# Patient Record
Sex: FEMALE | Race: WHITE
[De-identification: names, ages, dates, MRNs, and addresses within clinical notes are randomized per-mention and may not be internally consistent; named-entity substitution may affect disease eponyms.]

---

## 2019-02-13 ENCOUNTER — HOSPITAL ENCOUNTER (OUTPATIENT)
Dept: HOSPITAL 75 - CARD | Age: 59
End: 2019-02-13
Attending: NURSE PRACTITIONER
Payer: COMMERCIAL

## 2019-02-13 DIAGNOSIS — R07.9: Primary | ICD-10-CM

## 2019-02-13 LAB
ALBUMIN SERPL-MCNC: 4.2 GM/DL (ref 3.2–4.5)
ALP SERPL-CCNC: 78 U/L (ref 40–136)
ALT SERPL-CCNC: 21 U/L (ref 0–55)
BASOPHILS # BLD AUTO: 0 10^3/UL (ref 0–0.1)
BASOPHILS NFR BLD AUTO: 0 % (ref 0–10)
BILIRUB SERPL-MCNC: 0.2 MG/DL (ref 0.1–1)
BUN/CREAT SERPL: 20
CALCIUM SERPL-MCNC: 9.5 MG/DL (ref 8.5–10.1)
CHLORIDE SERPL-SCNC: 105 MMOL/L (ref 98–107)
CK MB SERPL-MCNC: 0.6 NG/ML (ref ?–6.6)
CO2 SERPL-SCNC: 21 MMOL/L (ref 21–32)
CREAT SERPL-MCNC: 0.7 MG/DL (ref 0.6–1.3)
EOSINOPHIL # BLD AUTO: 0.1 10^3/UL (ref 0–0.3)
EOSINOPHIL NFR BLD AUTO: 2 % (ref 0–10)
ERYTHROCYTE [DISTWIDTH] IN BLOOD BY AUTOMATED COUNT: 13.1 % (ref 10–14.5)
GFR SERPLBLD BASED ON 1.73 SQ M-ARVRAT: > 60 ML/MIN
GLUCOSE SERPL-MCNC: 98 MG/DL (ref 70–105)
HCT VFR BLD CALC: 43 % (ref 35–52)
HGB BLD-MCNC: 14.1 G/DL (ref 11.5–16)
LYMPHOCYTES # BLD AUTO: 2.6 X 10^3 (ref 1–4)
LYMPHOCYTES NFR BLD AUTO: 33 % (ref 12–44)
MANUAL DIFFERENTIAL PERFORMED BLD QL: NO
MCH RBC QN AUTO: 30 PG (ref 25–34)
MCHC RBC AUTO-ENTMCNC: 33 G/DL (ref 32–36)
MCV RBC AUTO: 92 FL (ref 80–99)
MONOCYTES # BLD AUTO: 0.6 X 10^3 (ref 0–1)
MONOCYTES NFR BLD AUTO: 8 % (ref 0–12)
NEUTROPHILS # BLD AUTO: 4.5 X 10^3 (ref 1.8–7.8)
NEUTROPHILS NFR BLD AUTO: 58 % (ref 42–75)
PLATELET # BLD: 223 10^3/UL (ref 130–400)
PMV BLD AUTO: 10.8 FL (ref 7.4–10.4)
POTASSIUM SERPL-SCNC: 4.2 MMOL/L (ref 3.6–5)
PROT SERPL-MCNC: 6.9 GM/DL (ref 6.4–8.2)
SODIUM SERPL-SCNC: 137 MMOL/L (ref 135–145)
WBC # BLD AUTO: 7.8 10^3/UL (ref 4.3–11)

## 2019-02-13 PROCEDURE — 93005 ELECTROCARDIOGRAM TRACING: CPT

## 2019-02-13 PROCEDURE — 84484 ASSAY OF TROPONIN QUANT: CPT

## 2019-02-13 PROCEDURE — 82553 CREATINE MB FRACTION: CPT

## 2019-02-13 PROCEDURE — 36415 COLL VENOUS BLD VENIPUNCTURE: CPT

## 2019-02-13 PROCEDURE — 85025 COMPLETE CBC W/AUTO DIFF WBC: CPT

## 2019-02-13 PROCEDURE — 80053 COMPREHEN METABOLIC PANEL: CPT

## 2019-04-02 ENCOUNTER — HOSPITAL ENCOUNTER (OUTPATIENT)
Dept: HOSPITAL 75 - RAD | Age: 59
End: 2019-04-02
Attending: NURSE PRACTITIONER
Payer: COMMERCIAL

## 2019-04-02 DIAGNOSIS — Z12.31: Primary | ICD-10-CM

## 2019-04-02 PROCEDURE — 77067 SCR MAMMO BI INCL CAD: CPT

## 2019-04-02 NOTE — DIAGNOSTIC IMAGING REPORT
INDICATION: 

Routine screening.



COMPARISON: 

04/05/2012.



TECHNIQUE: 

2D and 3D bilateral screening mammography was performed with CAD.



FINDINGS:

Scattered fibroglandular densities are identified bilaterally.

The parenchymal pattern is stable. No dominant mass or malignant

appearing microcalcifications are seen. The axillae are

unremarkable.



IMPRESSION: 

No mammographic features suspicious for malignancy are

identified.



ACR BI-RADS Category 2: Benign findings.

Result letter will be mailed to the patient.

Note: At least 10% of breast cancer is not imaged by mammography.



Dictated by: 



  Dictated on workstation # [id number]

## 2021-01-21 ENCOUNTER — HOSPITAL ENCOUNTER (OUTPATIENT)
Dept: HOSPITAL 75 - RAD | Age: 61
End: 2021-01-21
Attending: FAMILY MEDICINE
Payer: COMMERCIAL

## 2021-01-21 DIAGNOSIS — Z12.31: Primary | ICD-10-CM

## 2021-01-21 PROCEDURE — 77067 SCR MAMMO BI INCL CAD: CPT

## 2021-01-21 PROCEDURE — 77063 BREAST TOMOSYNTHESIS BI: CPT

## 2021-01-21 NOTE — DIAGNOSTIC IMAGING REPORT
INDICATION: Routine screening.



COMPARISON is made with prior mammogram from 04/02/2019.



2-D and 3-D bilateral screening mammography was performed with

CAD.



Both breasts are heterogeneous dense, limiting sensitivity of

mammography. The parenchymal pattern is stable. No mass or

malignant appearing microcalcifications  are seen. Axillae are

unremarkable.



IMPRESSION: BI-RADS Category 1



No mammographic features suspicious for malignancy are

identified. 



ACR BI-RADS Category 1: Negative.

Result letter will be mailed to the patient.

Note:  At least 10% of breast cancer is not imaged by

mammography.



Dictated by: 



  Dictated on workstation # [id number]

## 2021-04-08 ENCOUNTER — HOSPITAL ENCOUNTER (OUTPATIENT)
Dept: HOSPITAL 75 - RAD | Age: 61
End: 2021-04-08
Attending: NURSE PRACTITIONER
Payer: COMMERCIAL

## 2021-04-08 DIAGNOSIS — M17.11: Primary | ICD-10-CM

## 2021-04-08 PROCEDURE — 73562 X-RAY EXAM OF KNEE 3: CPT

## 2021-04-08 NOTE — DIAGNOSTIC IMAGING REPORT
INDICATION: Right knee pain.



Time of exam 11:03 AM



3 views right knee demonstrate normal alignment. There is medial

compartmental degenerative change with joint space narrowing and

marginal spurring. There is some spurring of the tibial spines.

No fracture, dislocation or effusion is detected.



IMPRESSION: Medial compartmental degenerative change. No acute

bony abnormality is detected.



Dictated by: 



  Dictated on workstation # [id number]

## 2021-06-04 ENCOUNTER — HOSPITAL ENCOUNTER (OUTPATIENT)
Dept: HOSPITAL 75 - RAD | Age: 61
End: 2021-06-04
Attending: FAMILY MEDICINE
Payer: COMMERCIAL

## 2021-06-04 DIAGNOSIS — M48.02: ICD-10-CM

## 2021-06-04 DIAGNOSIS — M50.31: ICD-10-CM

## 2021-06-04 DIAGNOSIS — M43.12: ICD-10-CM

## 2021-06-04 DIAGNOSIS — M50.121: ICD-10-CM

## 2021-06-04 DIAGNOSIS — G31.1: Primary | ICD-10-CM

## 2021-06-04 DIAGNOSIS — I67.82: ICD-10-CM

## 2021-06-04 DIAGNOSIS — M50.122: ICD-10-CM

## 2021-06-04 PROCEDURE — 72141 MRI NECK SPINE W/O DYE: CPT

## 2021-06-04 PROCEDURE — 70551 MRI BRAIN STEM W/O DYE: CPT

## 2021-06-04 NOTE — DIAGNOSTIC IMAGING REPORT
CLINICAL INDICATION: Patient with headache on the left side x1

month. Patient has disk disease.



EXAM: MRI of the brain performed without IV contrast. Sequences

include axial DWI, ADC map, axial T2, axial FLAIR, axial T1,

coronal gradient echo, and sagittal T1.



COMPARISON: None.



FINDINGS:

There is no evidence of acute cerebral infarct, intracranial

hemorrhage, or gross mass effect. 



The brain parenchymal volume appears appropriate for patient's

age. There are a few focal areas of high T2 signal white matter

changes involving both cerebral hemispheres, likely representing

chronic small vessel ischemic disease. There is normal gray-white

matter distinction.  There is no significant midline shift or

herniation. 



The [place] vascular structures show no gross

abnormality as visualized. The pituitary gland, sella, and

suprasellar regions are unremarkable as visualized. There is no

evidence of hydrocephalus. The basal cisterns are unremarkable.

The skull, extracranial soft tissue, and orbits are unremarkable.

The paranasal sinuses are unremarkable. Temporal bones show no

significant abnormality.



IMPRESSION:

1: There is no evidence of acute intracranial process.



2: Mild age-related brain parenchymal changes including mild

chronic small vessel ischemic disease.



Dictated by: 



  Dictated on workstation # [id number]

## 2021-06-07 NOTE — DIAGNOSTIC IMAGING REPORT
Clinical indication: Patient with headache involving the left

side x1 month. Disk disease.



Exam: MRI of the cervical spine performed without IV contrast. 

Sequences include sagittal T1, sagittal T2, sagittal stir,

sagittal T2 fat-sat and axial T2. Of note, axial T2 sequences

were performed on 06/07/2021 and added on to this exam.



Comparison: MRI of the cervical spine without contrast dated

12/28/2011.



Findings:

There is no acute cervical spine fracture. There is Modic type II

degenerative signal changes involving the C4-C5 and C5-C6

endplates.



Limited visualization of posterior fossa and cervical spinal cord

shows no significant abnormality. There is no significant

paraspinal soft tissue abnormality. There is progression of

degenerative spurs anteriorly involving the mid cervical spine.



C1-C2: There are degenerative spurs involving the atlantoodontoid

interval anteriorly with no significant central canal narrowing.



C2-C3: There is mild to moderate bilateral facet arthropathy

which is slightly progressed. There is no significant central

spinal canal or neural foramen narrowing.



C3-C4: There is interval development of a mild diffuse disk bulge

and small left-sided uncinate spurs. There is mild left neural

foramen narrowing. There is no significant right neural foramen

narrowing. There is minimal encroachment upon the central canal

anteriorly.



C4-C5: There is interval development of grade 1 retrolisthesis of

C4 on C5. There is development of a diffuse disk bulge and

bilateral uncinate spurs. There is moderate loss of disk space

height. There is mild to moderate central canal stenosis,

moderate to severe right neural foramen narrowing and mild left

neural foramen narrowing which has progressed in the interim.



C5-C6: There is interval development of mild grade 1

retrolisthesis of C5 on C6. There is increased size of the

diffuse disk bulge with moderate to severe loss of disk space

height which has progressed. There is severe bilateral neural

foramen narrowing and moderate central canal stenosis which has

progressed.



C6-C7: There is development of a subtle posterior disk bulge.

There is progression of mild bilateral neural foramen narrowing

and ligamentum flavum buckling. There is no significant central

spinal canal or neural foramen narrowing.



C7-T1: There is progression of mild bilateral facet arthropathy.

There is no significant central spinal canal or neural foramen

narrowing.



IMPRESSION:

1: There is no acute cervical spine fracture.



2: There is interval progression of cervical spine degenerative

disk disease which is most pronounced at the C3-C6 levels.



3: There is interval progression of grade 1 retrolisthesis of C4

on C5. There is interval development of a diffuse disk bulge with

mild to moderate central canal stenosis, moderate to severe right

neural foramen narrowing and mild left neural foramen narrowing.



4: There is interval development of grade 1 retrolisthesis of C5

on C6, increased diffuse disk bulge. There is progression of

severe C5-C6 bilateral neural foraminal narrowing and moderate

central canal stenosis.



 



Dictated by: 



  Dictated on workstation # [id number]

## 2021-07-08 ENCOUNTER — HOSPITAL ENCOUNTER (OUTPATIENT)
Dept: HOSPITAL 75 - REHAB | Age: 61
Discharge: HOME | End: 2021-07-08
Attending: FAMILY MEDICINE
Payer: COMMERCIAL

## 2021-07-08 DIAGNOSIS — R51.9: ICD-10-CM

## 2021-07-08 DIAGNOSIS — M54.2: Primary | ICD-10-CM

## 2021-07-08 DIAGNOSIS — Z87.39: ICD-10-CM

## 2022-11-10 ENCOUNTER — HOSPITAL ENCOUNTER (OUTPATIENT)
Dept: HOSPITAL 75 - RAD | Age: 62
End: 2022-11-10
Attending: NURSE PRACTITIONER
Payer: COMMERCIAL

## 2022-11-10 DIAGNOSIS — M23.231: ICD-10-CM

## 2022-11-10 DIAGNOSIS — M17.11: Primary | ICD-10-CM

## 2022-11-10 DIAGNOSIS — M66.0: ICD-10-CM

## 2022-11-10 PROCEDURE — 73721 MRI JNT OF LWR EXTRE W/O DYE: CPT

## 2022-11-10 NOTE — DIAGNOSTIC IMAGING REPORT
MRI RT lower ext joint w/o



TECHNIQUE: Multiplanar, multisequence MR imaging of the right

knee was performed without contrast.



COMPARISON: Right knee radiographs of 04/08/2021 



INDICATION: Right knee pain.



FINDINGS:



MENISCI 

Medial meniscus: Diffuse degenerative free edge truncation

throughout the entire medial meniscus. This is most noticeable in

the body of the medial meniscus.

Lateral meniscus: Normal.



LIGAMENTS

ACL: Intact.

PCL: Intact.

MCL: Intact.

LCL: The lateral collateral ligamentous complex is intact.



EXTENSOR MECHANISM

The extensor mechanism is intact.



CARTILAGE

Medial compartment: Diffuse full-thickness articular cartilage

loss throughout the weightbearing aspects of the medial

compartment. Subchondral bone marrow edema is present on both

sides of the joint.

Lateral compartment: The lateral compartment articular cartilage

is preserved without high-grade chondromalacia.

Patellofemoral compartment: The patellofemoral articular

cartilage is well preserved without high-grade chondromalacia.



BONE

No fracture, stress fracture or osteonecrosis. 



SOFT TISSUE

Large knee joint effusion. Small Baker's cyst has fluid tracking

away from the sac likely due to rupture.



IMPRESSION: 

1. Severe degenerative arthritis in the medial compartment with

diffuse full-thickness articular cartilage loss.

2. There is associated degenerative tearing throughout the free

edge of the medial meniscus.

3. Large knee joint effusion.

4. Small ruptured Baker's cyst. 





 



Dictated by: 



  Dictated on workstation # [id number]

## 2022-11-17 ENCOUNTER — HOSPITAL ENCOUNTER (OUTPATIENT)
Dept: HOSPITAL 75 - CATH | Age: 62
LOS: 1 days | Discharge: HOME | End: 2022-11-18
Attending: INTERNAL MEDICINE
Payer: COMMERCIAL

## 2022-11-17 VITALS — WEIGHT: 177.69 LBS | BODY MASS INDEX: 27.89 KG/M2 | HEIGHT: 67.01 IN

## 2022-11-17 DIAGNOSIS — K21.9: ICD-10-CM

## 2022-11-17 DIAGNOSIS — I26.92: Primary | ICD-10-CM

## 2022-11-17 DIAGNOSIS — Z87.891: ICD-10-CM

## 2022-11-17 DIAGNOSIS — E78.2: ICD-10-CM

## 2022-11-17 DIAGNOSIS — Z79.899: ICD-10-CM

## 2022-11-17 DIAGNOSIS — Z79.890: ICD-10-CM

## 2022-11-17 DIAGNOSIS — I10: ICD-10-CM

## 2022-11-17 DIAGNOSIS — E03.9: ICD-10-CM

## 2022-11-17 DIAGNOSIS — I25.110: ICD-10-CM

## 2022-11-17 LAB
ALBUMIN SERPL-MCNC: 3.7 GM/DL (ref 3.2–4.5)
ALP SERPL-CCNC: 75 U/L (ref 40–136)
ALT SERPL-CCNC: 104 U/L (ref 0–55)
BASOPHILS # BLD AUTO: 0 10^3/UL (ref 0–0.1)
BASOPHILS NFR BLD AUTO: 0 % (ref 0–10)
BILIRUB SERPL-MCNC: 0.4 MG/DL (ref 0.1–1)
BUN/CREAT SERPL: 18
CALCIUM SERPL-MCNC: 8.8 MG/DL (ref 8.5–10.1)
CHLORIDE SERPL-SCNC: 106 MMOL/L (ref 98–107)
CHOLEST SERPL-MCNC: 263 MG/DL (ref ?–200)
CO2 SERPL-SCNC: 20 MMOL/L (ref 21–32)
CREAT SERPL-MCNC: 0.66 MG/DL (ref 0.6–1.3)
EOSINOPHIL # BLD AUTO: 0.1 10^3/UL (ref 0–0.3)
EOSINOPHIL NFR BLD AUTO: 0 % (ref 0–10)
GFR SERPLBLD BASED ON 1.73 SQ M-ARVRAT: 98 ML/MIN
GLUCOSE SERPL-MCNC: 105 MG/DL (ref 70–105)
HCT VFR BLD CALC: 42 % (ref 35–52)
HDLC SERPL-MCNC: 66 MG/DL (ref 40–60)
HGB BLD-MCNC: 13.8 G/DL (ref 11.5–16)
LYMPHOCYTES # BLD AUTO: 1.9 10^3/UL (ref 1–4)
LYMPHOCYTES NFR BLD AUTO: 14 % (ref 12–44)
MANUAL DIFFERENTIAL PERFORMED BLD QL: NO
MCH RBC QN AUTO: 31 PG (ref 25–34)
MCHC RBC AUTO-ENTMCNC: 33 G/DL (ref 32–36)
MCV RBC AUTO: 94 FL (ref 80–99)
MONOCYTES # BLD AUTO: 1.1 10^3/UL (ref 0–1)
MONOCYTES NFR BLD AUTO: 8 % (ref 0–12)
NEUTROPHILS # BLD AUTO: 10.4 10^3/UL (ref 1.8–7.8)
NEUTROPHILS NFR BLD AUTO: 77 % (ref 42–75)
PLATELET # BLD: 147 10^3/UL (ref 130–400)
PMV BLD AUTO: 9.9 FL (ref 9–12.2)
POTASSIUM SERPL-SCNC: 3.9 MMOL/L (ref 3.6–5)
PROT SERPL-MCNC: 6.4 GM/DL (ref 6.4–8.2)
SODIUM SERPL-SCNC: 136 MMOL/L (ref 135–145)
TRIGL SERPL-MCNC: 247 MG/DL (ref ?–150)
VLDLC SERPL CALC-MCNC: 49 MG/DL (ref 5–40)
WBC # BLD AUTO: 13.6 10^3/UL (ref 4.3–11)

## 2022-11-17 PROCEDURE — 93306 TTE W/DOPPLER COMPLETE: CPT

## 2022-11-17 PROCEDURE — 87081 CULTURE SCREEN ONLY: CPT

## 2022-11-17 PROCEDURE — 85025 COMPLETE CBC W/AUTO DIFF WBC: CPT

## 2022-11-17 PROCEDURE — 93005 ELECTROCARDIOGRAM TRACING: CPT

## 2022-11-17 PROCEDURE — 85379 FIBRIN DEGRADATION QUANT: CPT

## 2022-11-17 PROCEDURE — 36415 COLL VENOUS BLD VENIPUNCTURE: CPT

## 2022-11-17 PROCEDURE — 80061 LIPID PANEL: CPT

## 2022-11-17 PROCEDURE — 93458 L HRT ARTERY/VENTRICLE ANGIO: CPT

## 2022-11-17 PROCEDURE — 84443 ASSAY THYROID STIM HORMONE: CPT

## 2022-11-17 PROCEDURE — 93970 EXTREMITY STUDY: CPT

## 2022-11-17 PROCEDURE — 80053 COMPREHEN METABOLIC PANEL: CPT

## 2022-11-17 PROCEDURE — 85027 COMPLETE CBC AUTOMATED: CPT

## 2022-11-17 PROCEDURE — 71275 CT ANGIOGRAPHY CHEST: CPT

## 2022-11-17 RX ADMIN — SODIUM CHLORIDE SCH MLS/HR: 900 INJECTION, SOLUTION INTRAVENOUS at 18:43

## 2022-11-17 RX ADMIN — APIXABAN SCH MG: 5 TABLET, FILM COATED ORAL at 21:20

## 2022-11-17 RX ADMIN — SODIUM CHLORIDE SCH MLS/HR: 900 INJECTION, SOLUTION INTRAVENOUS at 21:00

## 2022-11-17 RX ADMIN — APIXABAN SCH MG: 5 TABLET, FILM COATED ORAL at 15:05

## 2022-11-17 NOTE — CARDIAC CATH REPORT
CARDIAC CATHETERIZATION


DATE OF PROCEDURE: 11/17/2022





INDICATION: Abnormal electrocardiogram consistent with a possible anterior ST 

elevation myocardial infarction.





HISTORY: The patient is a 62 year old female with no previously known history of

coronary artery disease.  She presented to [facility] earlier this 

morning with chest pain.  Her electrocardiogram showed anterior ST elevation 

with lateral ST depression.  A code STEMI was called from the outside hospital 

and she was transferred to our hospital for emergent cardiac catheterization.  

Given her current clinical status, she is considered severely frail.  She has no

history of heart failure.





PROCEDURES PERFORMED: 


1.  Left heart catheterization with hemodynamic measurements.


2.  Diagnostic native coronary angiography.





PROCEDURE DESCRIPTION: After informed consent and in the fasting state, left 

heart catheterization was performed through the right radial artery utilizing a 

6 [place] system by percutaneous approach.  A 5 [place] JR4 catheter and a 6 

[place] CLS 3.5 guide catheter were utilized for the diagnostic portion of the 

procedure.  All catheters were exchanged over a guidewire.  Following the 

procedure, a vascular band was applied to the radial artery access site and the 

sheath was removed with good hemostasis.





RESULTS:





HEMODYNAMICS: The aortic pressure was 109/62 mmHg.  The left ventricular 

pressure was 124/0 mmHg with a left ventricular end-diastolic pressure of 5 

mmHg.  There was no significant pressure gradient upon pullback across the 

aortic valve.





CORONARY ANGIOGRAPHY:





Left main coronary artery: Free of significant disease.





Left anterior descending coronary artery: There was a 50% stenosis in the mid 

segment at the takeoff of the first septal  with LIVIA-3 flow.





Left circumflex coronary artery: Free of significant disease.





Right coronary artery: Dominant and free of significant disease.








IMPRESSION:


1.  Normal left heart pressures.


2.  There was a 50% stenosis in the mid left anterior descending coronary artery

but no evidence of an acute thrombotic occlusion to explain the ST elevation on 

the electrocardiogram.


3.  I will obtain an echocardiogram later today to assess her left ventricular 

systolic function.


4.  I suspect the patient may have had noncardiac chest pain.





Certain portions of this document may have been dictated utilizing voice 

recognition technology.  Inherent to this technology, typographical and 

grammatical errors may exist.  As much as I am diligent to identify and correct 

these mistakes, some errors may remain in the document.











GEMINI SANTANA JR, MD         Nov 17, 2022 11:42

## 2022-11-17 NOTE — PRE-OP NOTE & CONSCIOUS SEDAT
Pre-Operative Progress Note


Date H&P Reviewed:  Nov 17, 2022


Time H&P Reviewed:  10:24


History & Physical:  H&P Reviewed, Patient Examed, No changes noted


Pre-Op Diagnosis:  Anterior STEMI





Conscious Sedation Pre-Proced


ASA Score


2


For ASA 3 and 4: Consider anesthesia and medical clearance. Also, for patients

with a history of failed moderate sedation consider anesthesia.

















Airway 


 


Lungs 


 


Heart 


 


 ASA score


 


 ASA 1: a normal healthy patient


 


 ASA 2:  a patient with a mild systemic disease (mid diabetes, controlled 

hypertension, obesity 


 


 ASA 3:  a patient with a severe systemic disease that limits activity  (angina,

COPD, prior Myocardial infarction)


 


 ASA 4:  a patient with an incapacitating disease that is a constant threat to 

life (CHF, renal failure)


 


 ASA 5:  a moribund patient not expected to survive 24 hrs.  (ruptured aneurysm)


 


 ASA 6:  a declared brain-dead patient whose organs are being harvested.


 


 For emergent operations, add the letter E after the classification











Mallampati Classification


Grade 1





Sedation Plan


Analgesia, Amnesia, Plan communicated to team members, Discussed options with 

patient/fam, Discussed risks with patient/fam


The patient is an appropriate candidate to undergo the planned procedure, 

sedation, and anesthesia.





The patient immediately re-assessed prior to indication.





Given her current clinical status, she is considered severely frail.  She has no

history of heart failure.











GEMINI SANTANA JR, MD         Nov 17, 2022 10:25

## 2022-11-17 NOTE — HISTORY & PHYSICAL
ANAID HENSLEY 11/17/22 1031:


History of Present Illness


History of Present Illness


Reason for visit/HPI


Hammad is a 62 year old female with no prior cardiac history presenting from 

[place] [facility] with chest pain and SOB. EKG revealed ST elevation and she was 

transported to the via [facility] Cath lab by EMS. Her chest pain began at 8:30am 

radiating into her shoulder. She had been SOB for two days prior to the onset of

chest pain. She has never had these symptoms before. She does not smoke but does

have a family history of heart disease.


Date of Admission


11/17/22


Date Seen by a Provider:  Nov 17, 2022


Time Seen by a Provider:  10:30


I consulted on this patient on


11/17/22


 10:27


Attending Physician


Dior Thompson MD


Admitting Physician


Admitting Physician:


 








Attending Physician:


Gemini Pete Jr, MD


Consult








Allergies and Home Medications


Allergies


Coded Allergies:  


     No Known Drug Allergies (Unverified , 11/17/22)





Past Medical-Social-Family Hx


Patient Social History


Smoking Status:  Never a Smoker





Family Medical History


Heart Disease





Review of Systems


Respiratory:  dyspnea on exertion, short of breath


Cardiovascular:  chest pain





Physical Exam


Vital Signs


Capillary Refill :


Height, Weight, BMI


Height: '"


Weight: lbs. oz. kg;  BMI


Method:





GEMINI PETE JR, MD 11/17/22 1244:


History of Present Illness


History of Present Illness


Reason for visit/HPI





Dr. Pete,





I had the pleasure of seeing Sis in the cardiac catheterization laboratory 

today.  She has no known history of coronary artery disease but does have a 

cardiac risk factor of hypertension.  For the past couple of days she has been 

experiencing increasing shortness of breath.  She denies a cough, fever or 

chills.  She did not seek medical attention.  Then this morning she was making 

her bed and suddenly developed substernal chest tightness.  This was in the 

center of her chest.  This radiated to her shoulder.  This may have made her 

feel somewhat more short of breath.  She became concerned and went to the 

emergency room at [facility] for further evaluation.  Her el

ectrocardiogram showed sinus rhythm with ST elevation in V1 and V2 and ST 

depression in the lateral limb leads and lateral chest leads.  A code STEMI was 

called and she was transported to our hospital for emergency cardiac 

catheterization.  She denies any previous history of this sort of chest 

discomfort.  She denies paroxysmal nocturnal dyspnea, insomnia, palpitations, 

lightheadedness, syncope, or ankle edema.





Allergies and Home Medications


Allergies


Coded Allergies:  


     No Known Drug Allergies (Unverified , 11/17/22)





Patient Home Medication List


Home Medication List Reviewed:  Yes





Past Medical-Social-Family Hx


Patient Social History


Marrital Status:  


Smoking Status:  Former Smoker





Family Medical History


Heart Disease





Review of Systems


Constitutional:  see HPI


Review of 10 organ systems is as per the history of present illness, otherwise 

negative.





Physical Exam


General Appearance:  No Apparent Distress


Comments





Dr. Pete:





General: Alert. No acute distress. Well nourished and appears stated age.


Eye: Extraocular movements are intact. Conjunctivae are clear. There are no 

xanthelasma.


HENT: Normocephalic. Atraumatic. Carotid pulsations 2/2 without bruits.


Neck: Jugular venous pressure does not appear elevated. No thyromegaly 

appreciated.


Respiratory: Lungs are clear to auscultation. Respirations are non-labored. 

Breath sounds are equal. Symmetrical chest wall expansion.


Cardiovascular: Normal rate. Regular rhythm. No murmur. No gallop. Point of 

maximal impulse is not appear displaced. Good pulses equal in all extremities. 

No edema.


Gastrointestinal: Soft. Normal bowel sounds.


Skin: Skin turgor is normal. There is no pallor.


Musculoskeletal: No kyphosis or scoliosis appreciated.


Neurologic: Alert and oriented to person, place, time. Cranial nerves 3-12 

appear grossly intact. The patient has good motor tone strength in the upper and

lower extremities bilaterally.


Psychiatric: Cooperative. Appropriate mood & affect.





Assessment/Plan


Assessment and Plan


Problems:  


(1) Chest pain


Assessment & Plan:  Exact etiology unclear.  She underwent a cardiac 

catheterization that showed mild to moderate disease in the left anterior 

descending coronary artery but no acute vessel closure and no obstructive 

disease.  I suspect her chest discomfort could be due to gastroesophageal reflux

disease.  She was taking famotidine at home.  I will change this over to 

pantoprazole.  I have also ordered a D-dimer to screen for possible pulmonary 

embolism.





(2) Coronary artery disease with unstable angina pectoris


Assessment & Plan:  She does have coronary artery disease noted at cardiac 

catheterization but as above, this is nonobstructive.  She could have possibly 

had coronary spasm at the site of the atherosclerosis leading to the ST changes 

and her chest discomfort.  However, this does not appear to be an ST elevation 

myocardial infarction.  I recommend she start on aspirin and statin medication. 

I will resume her beta-blocker that she was taking at home.





(3) Primary hypertension


Assessment & Plan:  I will resume the metoprolol that she was taking at home.





(4) Gastroesophageal reflux disease without esophagitis


Assessment & Plan:  I will intensify her therapy by changing her H2 blocker over

to a proton pump inhibitor in the event the reflux disease is causing or at 

least contributing to her chest discomfort.





(5) Acquired hypothyroidism


Assessment & Plan:  I have ordered a TSH level. Resume thyroud medication.








Admission Diagnosis


Admission Status:  Observation





Supervisory-Addendum Brief


Verification & Attestation


Participated in pt care:  history, MDM, physical


Personally performed:  exam, history, MDM


Care discussed with:  Medical Student


Procedures:  n/a


Results interpretation:  Verified all documentation





I independently performed my own history and physical and physical examination. 

I formulated my own impression and plan.  I also reviewed the documentation of 

the medical student.











ANAID HENSLEY                    Nov 17, 2022 10:31


GEMINI PETE JR, MD         Nov 17, 2022 12:44

## 2022-11-17 NOTE — DIAGNOSTIC IMAGING REPORT
INDICATION: Elevated D-dimer, dyspnea



TECHNIQUE: Multiple contiguous axial images were obtained through

the chest after uneventful bolus administration of intravenous

contrast. 3D reconstructed CTA MIP acquisitions were also

performed.

Auto Exposure Controls were utilized during the CT exam to meet

ALARA standards for radiation dose reduction. 

 

COMPARISON: There is no prior CT for comparison. 



There are bilateral pulmonary emboli, including saddle component.

There is some RV dilatation and septal bowing suggesting right

heart strain. There is no adenopathy in the mediastinum or phoebe.

There are some coronary calcifications. The thoracic aorta shows

no aneurysm or dissection. Great vessel origins are patent. There

is no pleural or pericardial fluid.



Visualized portions of the upper abdomen appear unremarkable.



Lung parenchymal windows demonstrated no pulmonary parenchymal

infiltrates or discrete nodules.



IMPRESSION:



Bilateral pulmonary emboli, including saddle component, with

evidence of some degree of right heart strain.



Results were called to the referring physician at the time of

dictation.



Dictated by: 



  Dictated on workstation # [id number]

## 2022-11-17 NOTE — HISTORY & PHYSICIAL
History of Present Illness


History of Present Illness


Reason for visit/HPI


Hammad is a 63 y/o female who is known to me from clinic.  She presented to 

her local hospital with acute onset of shortness of breath, chest pain, 

diaphoresis and nausea that started early this morning while making her bed.  

She reports that she had been having intermittent episodes of shortness of 

breath over the past week.  She notes that she had recently traveled by vehicle 

[place]  She reports that the shortness of breath 

started after the travel by a week or so.


She was evaluated in the [place] ER, thought to have had a heart attack and was 

transferred to the care of Dr. Pete for emergent eval and heart c

atheterization.  The heart cath did not reveal any obstructive disease and 

further work-up revealed pulmonary emboli.  She was admitted to the icu for 

management.


She currently is not complaining of shortness of breath or chest pain, does have

some aching in her lower extremities.


Date of Admission


11/17/2022


Date Seen by a Provider:  Nov 17, 2022


Time Seen by a Provider:  18:30


Attending Physician


Yousuf Thompson MD


Admitting Physician


Admitting Physician:


 Dr. Pete





Attending Physician:


Marco Pete Jr, MD


Consult








Allergies and Home Medications


Allergies


Coded Allergies:  


     No Known Drug Allergies (Unverified , 11/17/22)





Patient Home Medication List


Home Medication List Reviewed:  Yes


Celecoxib (Celecoxib) 200 Mg Capsule, 200 MG PO DAILY, (Reported)


   Entered as Reported by: ISIDRO MCALLISTER on 11/17/22 1425


   Last Action: Held


Cholecalciferol (Vitamin D3) (Vitamin D3) 125 Mcg (5000 Unit) Capsule, 125 MCG 

PO HS, (Reported)


   Entered as Reported by: ISIDRO MCALLISTER on 11/17/22 1425


   Last Action: Reviewed


Estradiol (Estrace Tablet) 1 Mg Tablet, 1 MG PO DAILY, (Reported)


   Entered as Reported by: ISIDRO MCALLISTER on 11/17/22 1425


   Last Action: Held


Famotidine (Acid Reducer (FAMOTIDINE)) 20 Mg Tablet, 20 MG PO DAILY, (Reported)


   Entered as Reported by: ISIDRO MCALLISTER on 11/17/22 1425


   Last Action: Held


Hydrochlorothiazide (Hydrochlorothiazide) 25 Mg Tablet, 12.5 MG PO DAILY, 

(Reported)


   Entered as Reported by: ISIDRO MCALLISTER on 11/17/22 1425


   Last Action: Reviewed


Levothyroxine Sodium (Levothyroxine Sodium) 50 Mcg Tablet, 50 MCG PO 

ELLISON,MO,WE,FR,SA, (Reported)


   Entered as Reported by: ISIDRO MCALLISTER on 11/17/22 1425


   Last Action: Continued


Levothyroxine Sodium (Levothyroxine Sodium) 50 Mcg Tablet, 75 MCG PO TU,TH, 

(Reported)


   Entered as Reported by: ISIDRO MCALLISTER on 11/17/22 1425


   Last Action: Continued


Melatonin (Melatonin) 10 Mg Tablet, 10 MG PO HS, (Reported)


   Entered as Reported by: ISIDRO MCALLISTER on 11/17/22 1425


   Last Action: Continued


Metoprolol Succinate (Metoprolol Succinate) 25 Mg Tab.er.24h, 25 MG PO HS, 

(Reported)


   Entered as Reported by: ISIDRO MCALLISTER on 11/17/22 1425


   Last Action: Continued


Omega-3/Dha/Epa/Fish Oil (Fish Oil 1,200 mg Softgel) 1,200 Mg (144 Mg-216 Mg) 

Capsule, 1,200 MG PO HS, (Reported)


   Entered as Reported by: ISIDRO MCALLISTER on 11/17/22 1425


   Last Action: Converted





Past Medical-Social-Family Hx


Patient Social History


Marrital Status:  


Number of Children:  1


Living Status:  lives with spouse near [place]


Smoking Status:  Former Smoker


Have you traveled recently?:  Yes


Where was recent travel?:  [facility] [place] ([place])


Alcohol Use?:  Yes


Substance type:  Caffeine


Pt feels they are or have been:  No





Immunizations Up To Date


Date of Influenza Vaccine:  Nov 1, 2022





Seasonal Allergies


Seasonal Allergies:  Yes





Surgeries


Yes


Hysterectomy (2020), Orthopedic (2021)





Respiratory


No


Currently Using CPAP:  No


Currently Using BIPAP:  No





Cardiovascular


Yes


Hypertension





Neurological


No





Reproductive System


Pregnant:  No





Genitourinary


No





Gastrointestinal


Yes


Gastroesophageal Reflux





Musculoskeletal


Yes


Arthritis





Endocrine


History of Endocrine Disorders:  Yes


Endocrine Disorders:  Hypothyroidsim


Are Your Blood Sugars Over 250:  No





HEENT


History of HEENT Disorders:  No


Loss of Vision:  Denies


Hearing Impairment:  Denies





Cancer


No





Psychosocial


History of Psychiatric Problem:  No





Integumentary


History of Skin or Integumenta:  No





Blood Transfusions


History of Blood Disorders:  No


Adverse Reaction to a Blood Tr:  No





Reviewed Nursing Assessment


Reviewed/Agree w Nursing PMH:  Yes





Family Medical History


Significant Family History:  Heart Disease, Diabetes, Hypertension





Review of Systems


Constitutional:  No chills; diaphoresis (prior to admission); No dizziness, No 

fever, No malaise, No weakness


EENTM:  No hearing loss, No hoarseness, No throat pain


Respiratory:  No cough; dyspnea on exertion; No short of breath, No wheezing


Cardiovascular:  No chest pain (pt had shortness of breath and chest pain prior 

to admission)


Gastrointestinal:  No abdominal pain, No constipation, No diarrhea, No nausea


Genitourinary:  no symptoms reported


Pregnant:  No


Musculoskeletal:  No back pain; joint pain (right knee pain)


Skin:  no symptoms reported


Psychiatric/Neurological:  No Symptoms Reported





All Other Systems Reviewed


Negative Unless Noted:  Yes





Physical Exam


Vital Signs





Vital Signs - First Documented








 11/17/22 11/17/22 11/17/22





 11:10 11:15 11:23


 


Temp   36.4


 


Pulse 109  


 


Resp  13 


 


B/P (MAP)  120/79 (93) 


 


Pulse Ox  95 


 


O2 Delivery  Nasal Cannula 


 


O2 Flow Rate  2.00 





Capillary Refill : Less Than 3 Seconds


Height, Weight, BMI


Height: '"


Weight: lbs. oz. kg; 28.16 BMI


Method:


General Appearance:  No Apparent Distress, WD/WN


HEENT:  PERRL/EOMI, Pharynx Normal


Neck:  Full Range of Motion, Supple


Respiratory:  Chest Non Tender, Lungs Clear, Normal Breath Sounds, No Accessory 

Muscle Use, No Respiratory Distress


Cardiovascular:  Regular Rate, Rhythm


Gastrointestinal:  Normal Bowel Sounds, Non Tender, Soft


Rectal:  Deferred


Back:  Normal Inspection


Extremity:  Normal Capillary Refill, Normal Range of Motion, No Pedal Edema, 

Other (slightly ttp right popliteal fossa and left popliteal fossa)


Neurologic/Psychiatric:  Alert, Oriented x3, No Motor/Sensory Deficits, Normal 

Mood/Affect


Skin:  Normal Color, Warm/Dry


Lymphatic:  No Adenopathy





Assessment/Plan


Assessment and Plan


as documented below


Problems:  


(1) Pulmonary emboli


Status:  Acute


Qualifiers:  


   Qualified Codes:  I26.92 - Saddle embolus of pulmonary artery without acute 

cor pulmonale


Assessment & Plan:  Bilateral pulmonary embolism with saddle component.  pt was 

started on eliquis, will continue with this treatment and pt to be on eliquis 

for at least 6 months post embolism.


Lower extremity US pending for the morning.


CT scan findings as follows:


There are bilateral pulmonary emboli, including saddle component. There is some 

RV dilatation and septal bowing suggesting right heart strain. There is no 

adenopathy in the mediastinum or phoebe. There are some coronary calcifications. 

The thoracic aorta shows no aneurysm or dissection. Great vessel origins are 

patent. There is no pleural or pericardial fluid. Visualized portions of the 

upper abdomen appear unremarkable. Lung parenchymal windows demonstrated no 

pulmonary parenchymal infiltrates or discrete nodules.





IMPRESSION:


Bilateral pulmonary emboli, including saddle component, with evidence of some 

degree of right heart strain.





(2) Chest pain


Status:  Acute


Qualifiers:  


   Qualified Codes:  R07.89 - Other chest pain


Assessment & Plan:  Heart catheterization - per Dr. Pete report - mild to mod

disease LAD.


DDimer positive


Pulmonary embolus





(3) Coronary artery disease with unstable angina pectoris


Status:  Acute


Qualifiers:  


   Qualified Codes:  I25.110 - Atherosclerotic heart disease of native coronary 

artery with unstable angina pectoris


Assessment & Plan:  As above -non obstructive mild to mod disease of LAD - will 

need blood pressure control, cholesterol to be controlled with statin therapy





(4) Primary hypertension


Status:  Chronic


Assessment & Plan:  Pt on hctz and metoprolol at home, the metoprolol was 

restarted on admission





(5) Gastroesophageal reflux disease without esophagitis


Status:  Chronic


Assessment & Plan:  pt changed from pepcid to pantoprazole





(6) Acquired hypothyroidism


Status:  Chronic


Assessment & Plan:  resumed home levothyroxine tonight - monitor labs, add Free 

T4 to blood in lab








Admission Diagnosis


Admission Status:  Other











YOUSUF THOMPSON MD            Nov 17, 2022 19:51

## 2022-11-18 LAB
ALBUMIN SERPL-MCNC: 3.6 GM/DL (ref 3.2–4.5)
ALP SERPL-CCNC: 71 U/L (ref 40–136)
ALT SERPL-CCNC: 72 U/L (ref 0–55)
BILIRUB SERPL-MCNC: 0.5 MG/DL (ref 0.1–1)
BUN/CREAT SERPL: 14
CALCIUM SERPL-MCNC: 8.9 MG/DL (ref 8.5–10.1)
CHLORIDE SERPL-SCNC: 110 MMOL/L (ref 98–107)
CO2 SERPL-SCNC: 20 MMOL/L (ref 21–32)
CREAT SERPL-MCNC: 0.76 MG/DL (ref 0.6–1.3)
GFR SERPLBLD BASED ON 1.73 SQ M-ARVRAT: 87 ML/MIN
GLUCOSE SERPL-MCNC: 114 MG/DL (ref 70–105)
HCT VFR BLD CALC: 40 % (ref 35–52)
HGB BLD-MCNC: 13.2 G/DL (ref 11.5–16)
MCH RBC QN AUTO: 31 PG (ref 25–34)
MCHC RBC AUTO-ENTMCNC: 33 G/DL (ref 32–36)
MCV RBC AUTO: 94 FL (ref 80–99)
PLATELET # BLD: 166 10^3/UL (ref 130–400)
PMV BLD AUTO: 10.7 FL (ref 9–12.2)
POTASSIUM SERPL-SCNC: 4.1 MMOL/L (ref 3.6–5)
PROT SERPL-MCNC: 6.1 GM/DL (ref 6.4–8.2)
SODIUM SERPL-SCNC: 141 MMOL/L (ref 135–145)
WBC # BLD AUTO: 11.1 10^3/UL (ref 4.3–11)

## 2022-11-18 RX ADMIN — APIXABAN SCH MG: 5 TABLET, FILM COATED ORAL at 08:20

## 2022-11-18 NOTE — CARDIOLOGY PROGRESS NOTE
Subjective


Date Seen by Provider:  Nov 18, 2022


Time Seen by Provider:  07:50


Subjective/Events-last exam


Pt appears comfortable this a.m. She denies SOB, chest pain, and syncope. She 

does admit to feeling palpitations pretty frequently, usually when she is laying

in bed at night. These last for a few seconds at a time. She has taken a water 

pill in the past and recently experienced some edema while traveling, but does 

not currently have edema.





Dr. Pete:





I am following her due to coronary artery disease and pulmonary embolism.  Her 

chest discomfort and dyspnea have resolved.  Today she states she has had a long

history of palpitations.  She will feel a fluttering in her chest mainly at 

night while she is trying to go to sleep.  She denies associated complaints.  

She has not brought this to the attention of her primary provider in the past.  

She denies syncope or ankle edema.





Certain portions of this document may have been dictated utilizing voice 

recognition technology.  Inherent to this technology, typographical and 

grammatical errors may exist.  As much as I am diligent to identify and correct 

these mistakes, some errors may remain in the document.





Focused Exam


Respiratory:  Lungs Clear, Normal Breath Sounds, No Accessory Muscle Use, No 

Respiratory Distress


Cardiovascular:  Regular Rate, Rhythm, No Murmur


Skin:  normal color, warm/dry





Objective-Cardiology


Exam


Last Set of Vital Signs





Vital Signs








 11/18/22 11/18/22 11/18/22





 07:47 08:00 08:23


 


Temp 36.5  


 


Pulse  70 


 


Resp  16 


 


B/P (MAP)  115/69 (84) 


 


Pulse Ox   95


 


O2 Delivery   Room Air


 


O2 Flow Rate  2.00 








I&O











Intake and Output 


 


 11/18/22





 00:00


 


Intake Total 800 ml


 


Balance 800 ml


 


 


 


Intake Oral 800 ml


 


# Voids 2


 


Daily Weight Change No








General:  Alert, Oriented X3, Cooperative, No Acute Distress


HEENT:  Atraumatic, EOMI


Neck:  Supple


Lungs:  Clear to Auscultation, Normal Air Movement


Heart:  Regular Rate, No Murmurs


Abdomen:  Soft, No Tenderness


Extremities:  No Edema


Skin:  No Significant Lesion


Neuro:  Normal Speech, Normal Tone


Psych/Mental Status:  Mental Status NL, Mood NL


Other physical findings





Dr. Pete:





General: Alert. No acute distress.


Eye: No xanthelasma.


HENT: Normocephalic.


Neck: Jugular venous pressure does not appear elevated.


Respiratory: Lungs are clear to auscultation. Respirations are non-labored. 

Breath sounds are equal. Symmetrical chest wall expansion.


Cardiovascular: Normal rate. Regular rhythm. No murmur. No gallop. No edema.


Gastrointestinal: Soft. Normal bowel sounds.


Skin: Warm. Dry.


Neurologic: Alert and oriented to person, place, time. Cranial nerves 3-11 

grossly intact.


Psychiatric: Cooperative. Appropriate mood & affect.





Results


Lab


Laboratory Tests


11/17/22 13:08








11/18/22 05:11














A/P-Cardiology


Admission Diagnosis





(1) Chest pain


Status:  Acute


Qualifiers:  


   Qualified Codes:  R07.89 - Other chest pain


(2) Pulmonary embolism with acute cor pulmonale


(3) Coronary artery disease with unstable angina pectoris


Status:  Acute


Qualifiers:  


   Qualified Codes:  I25.110 - Atherosclerotic heart disease of native coronary 

artery with unstable angina pectoris


(4) Primary hypertension


Status:  Chronic


(5) Gastroesophageal reflux disease without esophagitis


Status:  Chronic





Assessment/Plan


(1) Chest pain


Assessment & Plan:  D-Dimer elevation revealed yesterday; CT showed bilateral 

pulmonary emboli with possible saddle embolus. Eliquis was started. Venous 

Doppler study done this a.m.





(2) Coronary artery disease with unstable angina pectoris


Assessment & Plan:  She does have coronary artery disease noted at cardiac 

catheterization but as above, this is nonobstructive. She was started on aspirin

and statin medication.  Resumed her beta-blocker that she was taking at home.





(3) Primary hypertension


Assessment & Plan:  Resumed the metoprolol that she was taking at home.





(4) Gastroesophageal reflux disease without esophagitis


Assessment & Plan: switched to pantoprazole 





(5) Acquired hypothyroidism


Assessment & Plan:  Resumed thyroid medication.


(6) Palpitations


Assessment & Plan: May require outpatient monitor. Continue telemetry for now.





Diagnosis/Problems


Diagnosis/Problems





(1) Pulmonary embolism with acute cor pulmonale


Assessment & Plan:  In retrospect, she did have a long drive about 2 weeks ago. 

She was also on estrogen replacement therapy but had started to taper this on 

her own.  She is on Eliquis but this may be too expensive for her.  I will 

change this over to Xarelto which she can get through the [employer] [address] at a 

markedly reduced rate.





(2) Chest pain


Status:  Acute


Assessment & Plan:  I suspect this was related to the pulmonary embolism and not

coronary artery disease.


Qualifiers:  


   Qualified Codes:  R07.89 - Other chest pain


(3) Coronary artery disease with unstable angina pectoris


Status:  Acute


Assessment & Plan:  She has moderate disease of the left anterior descending 

coronary artery but did not have a myocardial infarction and she has normal 

ejection fraction.  Since she is on Xarelto for the pulmonary embolism, I will 

hold off on starting aspirin.  When she completes the course of therapy for the 

pulmonary embolism and stops her oral anticoagulation, I would consider starting

low strength aspirin.  I did start a statin medication due to the coronary 

artery disease.  She was already taking beta-blocker at home for hypertension.  

I will plan to see her in the office in 1 month for follow-up.


Qualifiers:  


   Qualified Codes:  I25.110 - Atherosclerotic heart disease of native coronary 

artery with unstable angina pectoris


(4) Primary hypertension


Status:  Chronic


Assessment & Plan:  Continue current outpatient antihypertensive medication.





(5) Palpitations


Assessment & Plan:  Etiology unclear.  There is no evidence of 

Carl-Parkinson-White, Brugada syndrome, or prolonged or short QT on her resting

electrocardiogram.  From her description, this may just be due to isolated marly

ture beats.  If this continues following discharge, we could always consider 

some sort of external event recorder.





(6) Mixed hyperlipidemia


Assessment & Plan:  She has been started on statin medication in light of the 

elevated LDL and coronary artery disease detected at cardiac catheterization 

during this admission.





(7) Gastroesophageal reflux disease without esophagitis


Status:  Chronic


Assessment & Plan:  I had thought the reflux disease might be contributing to 

her chest discomfort but now she has been found to have a pulmonary embolism 

which probably explains her symptoms at the time of admission.  She could 

probably go back to taking famotidine which she was taking prior to admission.








Supervisory-Addendum Brief


Verification & Attestation


Participated in pt care:  history, MDM, physical


Personally performed:  exam, history, MDM, supervision of care


Care discussed with:  Medical Student


Procedures:  n/a


Results interpretation:  Verified all documentation





I independently performed my own history and physical and physical examination. 

I formulated my own impression and plan.  I also reviewed the documentation of 

the medical student.











ANAID HENSLEY                    Nov 18, 2022 08:23


GEMINI PETE JR, MD         Nov 18, 2022 09:05

## 2022-11-18 NOTE — DISCHARGE SUMMARY
Diagnosis/Chief Complaint


Date of Admission





Date of Discharge





Discharge Date:  Nov 18, 2022


Discharge Time:  1030


Reason Hospital Visit


Hammad is a 63 y/o female who is known to me from clinic.  She presented to 

her local hospital with acute onset of shortness of breath, chest pain, 

diaphoresis and nausea that started early this morning while making her bed.  

She reports that she had been having intermittent episodes of shortness of 

breath over the past week.  She notes that she had recently traveled by vehicle 

[place]  She reports that the shortness of breath 

started after the travel by a week or so.


She was evaluated in the [place] ER, thought to have had a heart attack and was 

transferred to the care of Dr. Pete for emergent eval and heart 

catheterization.  The heart cath did not reveal any obstructive disease and 

further work-up revealed pulmonary emboli.  She was admitted to the icu for 

management.


She currently is not complaining of shortness of breath or chest pain, does have

some aching in her lower extremities.





Discharge Summary


Discharge Physical Examination


Allergies:  


Coded Allergies:  


     No Known Drug Allergies (Unverified , 11/17/22)


Vitals & I&Os





Vital Signs








  Date Time  Temp Pulse Resp B/P (MAP) Pulse Ox O2 Delivery O2 Flow Rate FiO2


 


11/18/22 08:23     95 Room Air  


 


11/18/22 08:00  70 16 115/69 (84)   2.00 


 


11/18/22 07:47 36.5       











Hospital Course


Pending Labs


Laboratory Tests


11/18/22 05:11: 


White Blood Count 11.1, Red Blood Count 4.27, Hemoglobin 13.2, Hematocrit 40, 

Mean Corpuscular Volume 94, Mean Corpuscular Hemoglobin 31, Mean Corpuscular 

Hemoglobin Concent 33, Red Cell Distribution Width 13.2, Platelet Count 166, 

Mean Platelet Volume 10.7, Sodium Level 141, Potassium Level 4.1, Chloride Level

110, Carbon Dioxide Level 20, Anion Gap 11, Blood Urea Nitrogen 11, Creatinine 

0.76, Estimat Glomerular Filtration Rate 87, BUN/Creatinine Ratio 14, Glucose 

Level 114, Calcium Level 8.9, Corrected Calcium 9.2, Total Bilirubin 0.5, 

Aspartate Amino Transf (AST/SGOT) 39, Alanine Aminotransferase (ALT/SGPT) 72, 

Alkaline Phosphatase 71, Total Protein 6.1, Albumin 3.6








Discharge


Instructions to patient/family


Please see electronic discharge instructions given to patient.


Discharge Medications


Reviewed and agree with Discharge Medication list on patient's Discharge 

Instruction sheet











YOUSUF DANGELO MD            Nov 18, 2022 09:17

## 2022-11-18 NOTE — DISCHARGE INST-SIMPLE/STANDARD
Discharge Inst-Standard


Reconcile Patient Problems


Problems Reviewed?:  Yes





Discharge Medications


New, Converted or Re-Newed RX:  Transmitted to Pharmacy





Patient Instructions/Follow Up


Plan of Care/Instructions/FU:  


follow up with keena young beginning of december


we will get you scheduled in feb/march with hematology


follow up with dr. powell per patient request in the next few months


Activity as Tolerated:  Yes


Discharge Diet:  Low Fat/Low Cholesterol


Return to The Hospital For:  


acute worsening shortness of breath, chest pain, or other concerns for


lifethreatening illness











YOUSUF DANGELO MD            Nov 18, 2022 09:17

## 2022-11-18 NOTE — DIAGNOSTIC IMAGING REPORT
PROCEDURE: US Venous Lower Ext Coy.



TECHNIQUE: Multiple real-time grayscale images were obtained over

the lower extremities in various projections, bilaterally.

Additional duplex Doppler and color Doppler images were also

obtained.



INDICATION: Pulmonary embolus. Lower leg pain



Bilateral lower extremity venous Doppler 11/18/2022.



TECHNIQUE: Waveform and spectral analysis of the lower extremity

venous structures.



FINDINGS: There is no evidence for deep vein thrombosis with

normal compressibility, augmentation and spontaneity of all

visualized venous structures.



IMPRESSION:

1. No evidence for deep vein thrombosis.





Dictated by: 



  Dictated on workstation # [id number]

## 2022-12-12 ENCOUNTER — HOSPITAL ENCOUNTER (OUTPATIENT)
Dept: HOSPITAL 75 - CARD | Age: 62
End: 2022-12-12
Attending: FAMILY MEDICINE
Payer: COMMERCIAL

## 2022-12-12 DIAGNOSIS — I26.99: Primary | ICD-10-CM

## 2022-12-12 PROCEDURE — 93246 EXT ECG>7D<15D RECORDING: CPT

## 2023-01-20 ENCOUNTER — HOSPITAL ENCOUNTER (EMERGENCY)
Dept: HOSPITAL 75 - ER | Age: 63
Discharge: HOME | End: 2023-01-20
Payer: COMMERCIAL

## 2023-01-20 VITALS — DIASTOLIC BLOOD PRESSURE: 75 MMHG | SYSTOLIC BLOOD PRESSURE: 129 MMHG

## 2023-01-20 VITALS — WEIGHT: 179.9 LBS | BODY MASS INDEX: 28.24 KG/M2 | HEIGHT: 66.93 IN

## 2023-01-20 DIAGNOSIS — I25.10: ICD-10-CM

## 2023-01-20 DIAGNOSIS — R07.89: Primary | ICD-10-CM

## 2023-01-20 DIAGNOSIS — Z79.01: ICD-10-CM

## 2023-01-20 DIAGNOSIS — Z20.822: ICD-10-CM

## 2023-01-20 LAB
ALBUMIN SERPL-MCNC: 4.6 GM/DL (ref 3.2–4.5)
ALP SERPL-CCNC: 81 U/L (ref 40–136)
ALT SERPL-CCNC: 41 U/L (ref 0–55)
APTT BLD: 42 SEC (ref 24–35)
BASOPHILS # BLD AUTO: 0 10^3/UL (ref 0–0.1)
BASOPHILS NFR BLD AUTO: 0 % (ref 0–10)
BILIRUB SERPL-MCNC: 0.4 MG/DL (ref 0.1–1)
BUN/CREAT SERPL: 15
CALCIUM SERPL-MCNC: 10.1 MG/DL (ref 8.5–10.1)
CHLORIDE SERPL-SCNC: 102 MMOL/L (ref 98–107)
CO2 SERPL-SCNC: 20 MMOL/L (ref 21–32)
CREAT SERPL-MCNC: 0.8 MG/DL (ref 0.6–1.3)
EOSINOPHIL # BLD AUTO: 0.1 10^3/UL (ref 0–0.3)
EOSINOPHIL NFR BLD AUTO: 1 % (ref 0–10)
GFR SERPLBLD BASED ON 1.73 SQ M-ARVRAT: 82 ML/MIN
GLUCOSE SERPL-MCNC: 117 MG/DL (ref 70–105)
HCT VFR BLD CALC: 46 % (ref 35–52)
HGB BLD-MCNC: 15.4 G/DL (ref 11.5–16)
INR PPP: 1.9 (ref 0.8–1.4)
LYMPHOCYTES # BLD AUTO: 4.2 10^3/UL (ref 1–4)
LYMPHOCYTES NFR BLD AUTO: 41 % (ref 12–44)
MAGNESIUM SERPL-MCNC: 2.1 MG/DL (ref 1.6–2.4)
MANUAL DIFFERENTIAL PERFORMED BLD QL: NO
MCH RBC QN AUTO: 31 PG (ref 25–34)
MCHC RBC AUTO-ENTMCNC: 33 G/DL (ref 32–36)
MCV RBC AUTO: 93 FL (ref 80–99)
MONOCYTES # BLD AUTO: 0.7 10^3/UL (ref 0–1)
MONOCYTES NFR BLD AUTO: 7 % (ref 0–12)
NEUTROPHILS # BLD AUTO: 5 10^3/UL (ref 1.8–7.8)
NEUTROPHILS NFR BLD AUTO: 50 % (ref 42–75)
PLATELET # BLD: 239 10^3/UL (ref 130–400)
PMV BLD AUTO: 10.3 FL (ref 9–12.2)
POTASSIUM SERPL-SCNC: 3.6 MMOL/L (ref 3.6–5)
PROT SERPL-MCNC: 7.7 GM/DL (ref 6.4–8.2)
PROTHROMBIN TIME: 21.8 SEC (ref 12.2–14.7)
SODIUM SERPL-SCNC: 136 MMOL/L (ref 135–145)
T4 FREE SERPL-MCNC: 1.32 NG/DL (ref 0.7–1.48)
WBC # BLD AUTO: 10.1 10^3/UL (ref 4.3–11)

## 2023-01-20 PROCEDURE — 84443 ASSAY THYROID STIM HORMONE: CPT

## 2023-01-20 PROCEDURE — 84484 ASSAY OF TROPONIN QUANT: CPT

## 2023-01-20 PROCEDURE — 71045 X-RAY EXAM CHEST 1 VIEW: CPT

## 2023-01-20 PROCEDURE — 85730 THROMBOPLASTIN TIME PARTIAL: CPT

## 2023-01-20 PROCEDURE — 84439 ASSAY OF FREE THYROXINE: CPT

## 2023-01-20 PROCEDURE — 36415 COLL VENOUS BLD VENIPUNCTURE: CPT

## 2023-01-20 PROCEDURE — 83874 ASSAY OF MYOGLOBIN: CPT

## 2023-01-20 PROCEDURE — 93041 RHYTHM ECG TRACING: CPT

## 2023-01-20 PROCEDURE — 85025 COMPLETE CBC W/AUTO DIFF WBC: CPT

## 2023-01-20 PROCEDURE — 85610 PROTHROMBIN TIME: CPT

## 2023-01-20 PROCEDURE — 80053 COMPREHEN METABOLIC PANEL: CPT

## 2023-01-20 PROCEDURE — 87636 SARSCOV2 & INF A&B AMP PRB: CPT

## 2023-01-20 PROCEDURE — 83735 ASSAY OF MAGNESIUM: CPT

## 2023-01-20 PROCEDURE — 93005 ELECTROCARDIOGRAM TRACING: CPT

## 2023-01-20 RX ADMIN — NITROGLYCERIN PRN MG: 0.4 TABLET SUBLINGUAL at 09:42

## 2023-01-20 RX ADMIN — NITROGLYCERIN PRN MG: 0.4 TABLET SUBLINGUAL at 09:55

## 2023-01-20 RX ADMIN — NITROGLYCERIN PRN MG: 0.4 TABLET SUBLINGUAL at 09:47

## 2023-01-20 NOTE — DIAGNOSTIC IMAGING REPORT
CHEST 1 VIEW, AP/PA ONLY



Indication: Chest pain. 



Comparison: None available. 



Findings:

No focal airspace disease in the visualized lungs. No pleural

effusion or pneumothorax. Normal cardiomediastinal silhouette.



Impression: 

1. No acute cardiopulmonary process by portable radiography.



Dictated by: 



  Dictated on workstation # [id number]

## 2023-01-20 NOTE — ED CHEST PAIN
General


Chief Complaint:  Chest Pain


Stated Complaint:  CHEST PAINS


Nursing Triage Note:  


PT AMBULATORY TO ER WITH SO. PT REPORTS L SIDED CHEST PAIN ONSET APPROX 0730 


THIS AM, REPORTS PAIN RADIATES TO HER L SHOULDER AND UPPER BACK, DESCRIBES PAIN 


AS A PRESSURE/HEAVINESS. REPORTS PALPITATIONS LAST NIGHT. PT STATES DX WITH A PE




2 MONTHS AGO, TAKING XARELTO.


Source:  patient, old records


Exam Limitations:  no limitations





History of Present Illness


Date Seen by Provider:  2023


Time Seen by Provider:  09:22


Initial Comments


This 62-year-old woman presents to the emergency room with primary complaint of 

chest pressure and tightness noted at 730 this morning.  She has had some 

intermittent lesser episodes of chest discomfort and "irregular heartbeat" over 

the past few days.  The chest tightness and pressure radiates to her shoulder 

and back.  She also describes a chest heaviness, lightheadedness, and feeling 

winded at times.  Discomfort at its worst this morning was 8/10.  Pain is now 

5/10.  She has history of coronary artery disease with angiography performed 

2022 demonstrating 50% stenosis of the mid LAD.  No interventions were 

performed.  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 

and she maintains treatment on Xarelto.  She denies missing any doses.  Her last

Xarelto dose was this morning.  She also took aspirin 162 mg this morning.  She 

is noted to be hypertensive during assessment.  She is the primary historian.  

Chart was also reviewed including notes from the prior admission and the 

angiography report.  Her cardiologist is Dr. Pete, transitioning to Dr. Medina.  Her primary care provider is Dr. Thompson.





Allergies and Home Medications


Allergies


Coded Allergies:  


     No Known Drug Allergies (Unverified , 22)





Patient Home Medication List


Home Medication List Reviewed:  Yes


Cholecalciferol (Vitamin D3) (Vitamin D3) 125 Mcg (5000 Unit) Capsule, 125 MCG 

PO HS, (Reported)


   Entered as Reported by: ISIDRO MCALLISTER on 22 1422


Famotidine (Acid Reducer (FAMOTIDINE)) 20 Mg Tablet, 20 MG PO DAILY, (Reported)


   Entered as Reported by: ISIDRO MCALLISTER on 22 1420


Hydrochlorothiazide (Hydrochlorothiazide) 25 Mg Tablet, 12.5 MG PO DAILY, 

(Reported)


   Entered as Reported by: ISIDRO MCALLISTER on 22


Hydrocodone/Acetaminophen (Hydrocodone-Acetamin 5-325 mg) 5 Mg-325 Mg Tablet, 1 

TAB PO Q4H PRN for PAIN-MODERATE (5-7)


   Prescribed by: YOUSUF THOMPSON on 22 09


Levothyroxine Sodium (Levothyroxine Sodium) 50 Mcg Tablet, 50 MCG PO 

ELLISON,MO,WE,FR,SA, (Reported)


   Entered as Reported by: ISIDRO MCALLISTER on 22


Levothyroxine Sodium (Levothyroxine Sodium) 50 Mcg Tablet, 75 MCG PO ,, 

(Reported)


   Entered as Reported by: ISIDRO MCALLISTER on 22


Melatonin (Melatonin) 10 Mg Tablet, 10 MG PO HS, (Reported)


   Entered as Reported by: ISIDRO MCALLISTER on 22


Metoprolol Succinate (Metoprolol Succinate) 25 Mg Tab.er.24h, 25 MG PO HS, 

(Reported)


   Entered as Reported by: ISIDRO MCALLISTER on 22


Omega-3/Dha/Epa/Fish Oil (Fish Oil 1,200 mg Softgel) 1,200 Mg (144 Mg-216 Mg) 

Capsule, 1,200 MG PO HS, (Reported)


   Entered as Reported by: ISIDRO MCALLISTER on 22


Rivaroxaban (Xarelto Tablet) 15 Mg Tablet, 15 MG PO BID@0700,1900


   Prescribed by: GEMINI PETE JR, MD on 22 09


Rosuvastatin Calcium (Rosuvastatin Calcium) 20 Mg Tablet, 20 MG PO HS


   Prescribed by: YOUSUF THOMPSON on 22 09





Review of Systems


Review of Systems


Constitutional:  no symptoms reported; No fever


EENTM:  No Symptoms Reported


Respiratory:  See HPI


Cardiovascular:  See HPI


Gastrointestinal:  See HPI


Genitourinary:  No Symptoms Reported


Musculoskeletal:  no symptoms reported


Skin:  no symptoms reported


Psychiatric/Neurological:  No Symptoms Reported


Endocrine:  No Symptoms Reported


Hematologic/Lymphatic:  No Symptoms Reported





Past Medical-Social-Family Hx


Patient Social History


Tobacco Use?:  No


Use of E-Cig and/or Vaping dev:  No


Substance use?:  No


Alcohol Use?:  No


Pt feels they are or have been:  No





Immunizations Up To Date


First/Initial COVID19 Vaccinat:  RECEIVED, UNK WHEN


Second COVID19 Vaccination Waylon:  RECEIVED, UNK WHEN


COVID19 Vaccine :  JOSE ENRIQUE DOWNS





Seasonal Allergies


Seasonal Allergies:  Yes





Past Medical History


Surgeries:  Yes


Cardiac (Cardiac angiography  without intervention), Hysterectomy, 

Orthopedic


Respiratory:  Yes


Pulmonary Embolism


Currently Using CPAP:  No


Currently Using BIPAP:  No


Cardiac:  Yes


Coronary Artery Disease (50% stenosis mid LAD , no intervention), 

Hypertension


Neurological:  No


Pregnant:  No


Genitourinary:  No


Gastrointestinal:  Yes


Gastroesophageal Reflux


Musculoskeletal:  Yes


Arthritis


Endocrine:  Yes


Hypothyroidsim


HEENT:  No


Loss of Vision:  Denies


Hearing Impairment:  Denies


Cancer:  No


Psychosocial:  No


Integumentary:  No


Blood Disorders:  No


Adverse Reaction/Blood Tranf:  No





Family Medical History


Heart Disease, Diabetes, Hypertension





Physical Exam


Vital Signs





Vital Signs - First Documented








 23





 09:20


 


Temp 36.6


 


Pulse 81


 


Resp 20


 


B/P (MAP) 198/104 (135)


 


Pulse Ox 98


 


O2 Delivery Room Air





Capillary Refill :


Height, Weight, BMI


Height: '"


Weight: lbs. oz. kg; 28.00 BMI


Method:


General Appearance:  No Apparent Distress, WD/WN


HEENT:  PERRL/EOMI, Normal ENT Inspection


Neck:  Normal Inspection; No JVD


Respiratory:  Chest Non Tender, Lungs Clear, Normal Breath Sounds, No Accessory 

Muscle Use, No Respiratory Distress


Cardiovascular:  Regular Rate, Rhythm, No Edema, No Murmur, Normal Peripheral 

Pulses


Gastrointestinal:  Normal Bowel Sounds, Non Tender, Soft; No Distended


Extremity:  Normal Inspection, Non Tender, No Calf Tenderness, No Pedal Edema


Neurologic/Psychiatric:  Alert, Oriented x3, No Motor/Sensory Deficits, Normal 

Mood/Affect, CNs II-XII Norm as Tested


Skin:  Normal Color, Warm/Dry





Progress/Results/Core Measures


Results/Orders


Lab Results





Laboratory Tests








Test


 23


09:27 23


09:42 23


11:29 Range/Units


 


 


White Blood Count


 10.1 


 


 


 4.3-11.0


10^3/uL


 


Red Blood Count


 4.99 


 


 


 3.80-5.11


10^6/uL


 


Hemoglobin 15.4    11.5-16.0  g/dL


 


Hematocrit 46    35-52  %


 


Mean Corpuscular Volume 93    80-99  fL


 


Mean Corpuscular Hemoglobin 31    25-34  pg


 


Mean Corpuscular Hemoglobin


Concent 33 


 


 


 32-36  g/dL





 


Red Cell Distribution Width 12.5    10.0-14.5  %


 


Platelet Count


 239 


 


 


 130-400


10^3/uL


 


Mean Platelet Volume 10.3    9.0-12.2  fL


 


Immature Granulocyte % (Auto) 0     %


 


Neutrophils (%) (Auto) 50    42-75  %


 


Lymphocytes (%) (Auto) 41    12-44  %


 


Monocytes (%) (Auto) 7    0-12  %


 


Eosinophils (%) (Auto) 1    0-10  %


 


Basophils (%) (Auto) 0    0-10  %


 


Neutrophils # (Auto)


 5.0 


 


 


 1.8-7.8


10^3/uL


 


Lymphocytes # (Auto)


 4.2 H


 


 


 1.0-4.0


10^3/uL


 


Monocytes # (Auto)


 0.7 


 


 


 0.0-1.0


10^3/uL


 


Eosinophils # (Auto)


 0.1 


 


 


 0.0-0.3


10^3/uL


 


Basophils # (Auto)


 0.0 


 


 


 0.0-0.1


10^3/uL


 


Immature Granulocyte # (Auto)


 0.0 


 


 


 0.0-0.1


10^3/uL


 


Prothrombin Time 21.8 H   12.2-14.7  SEC


 


INR Comment 1.9 H   0.8-1.4  


 


Activated Partial


Thromboplast Time 42 H


 


 


 24-35  SEC





 


Sodium Level 136    135-145  MMOL/L


 


Potassium Level 3.6    3.6-5.0  MMOL/L


 


Chloride Level 102      MMOL/L


 


Carbon Dioxide Level 20 L   21-32  MMOL/L


 


Anion Gap 14    5-14  MMOL/L


 


Blood Urea Nitrogen 12    7-18  MG/DL


 


Creatinine


 0.80 


 


 


 0.60-1.30


MG/DL


 


Estimat Glomerular Filtration


Rate 82 


 


 


  





 


BUN/Creatinine Ratio 15     


 


Glucose Level 117 H     MG/DL


 


Calcium Level 10.1    8.5-10.1  MG/DL


 


Corrected Calcium     8.5-10.1  MG/DL


 


Magnesium Level 2.1    1.6-2.4  MG/DL


 


Total Bilirubin 0.4    0.1-1.0  MG/DL


 


Aspartate Amino Transf


(AST/SGOT) 27 


 


 


 5-34  U/L





 


Alanine Aminotransferase


(ALT/SGPT) 41 


 


 


 0-55  U/L





 


Alkaline Phosphatase 81      U/L


 


Myoglobin


 32.0 


 


 


 10.0-92.0


NG/ML


 


Troponin I < 0.028   < 0.028  <0.028  NG/ML


 


Total Protein 7.7    6.4-8.2  GM/DL


 


Albumin 4.6 H   3.2-4.5  GM/DL


 


Thyroid Stimulating Hormone


(TSH) 1.97 


 


 


 0.35-4.94


UIU/ML


 


Free Thyroxine


 1.32 


 


 


 0.70-1.48


NG/DL


 


Influenza Type A (RT-PCR)  Not Detected   Not Detecte  


 


Influenza Type B (RT-PCR)  Not Detected   Not Detecte  


 


SARS-CoV-2 RNA (RT-PCR)  Not Detected   Not Detecte  








My Orders





Orders - CARLOS DEVLIN MD


Cbc With Automated Diff (23 09:22)


Magnesium (23 09:22)


Chest 1 View, Ap/Pa Only (23 09:22)


Ekg Tracing (23 09:22)


Comprehensive Metabolic Panel (23 09:22)


Myoglobin Serum (23 09:22)


Protime With Inr (23 09:22)


Partial Thromboplastin Time (23 09:22)


O2 (23 09:22)


Monitor-Rhythm Ecg Trace Only (23 09:22)


Lipid Panel (23 06:00)


Ed Iv/Invasive Line Start (23 09:22)


Troponin I Sage (23 09:22)


Covid 19 Inhouse Test (23 09:36)


Influenza A And B By Pcr (23 09:36)


Nitroglycerin 0.4 Mg Btl 25's (Nitrostat (23 09:45)


Aspirin Chewable Tablet (Baby Aspirin Ch (23 09:45)


Ondansetron Injection (Zofran Injectio (23 09:45)


Thyroid Stimulating Hormone (23 09:36)


Free T4 (Free Thyroxine) (23 09:36)


Troponin I Giles (23 11:30)


Ondansetron Injection (Zofran Injectio (23 11:15)


Lidocaine 2% Viscous 15 Ml (Xylocaine Vi (23 11:15)


Antacid  Suspension (Mylanta  Suspension (23 11:15)





Medications Given in ED





Current Medications








 Medications  Dose


 Ordered  Sig/Abner


 Route  Start Time


 Stop Time Status Last Admin


Dose Admin


 


 Al Hydrox/Mg


 Hydrox/Simethicone  30 ml  ONCE  ONCE


 PO  23 11:15


 23 11:16 DC 23 11:14


30 ML


 


 Aspirin  162 mg  ONCE  ONCE


 PO  23 09:45


 23 09:46 DC 23 09:44


162 MG


 


 Lidocaine HCl  15 ml  ONCE  ONCE


 PO  23 11:15


 23 11:16 DC 23 11:14


15 ML


 


 Nitroglycerin  0.4 mg  UD  PRN


 SL  23 09:45


 23 09:57 DC 23 09:55


0.4 MG


 


 Ondansetron HCl  4 mg  ONCE  ONCE


 IVP  23 09:45


 23 09:46 DC 23 09:43


4 MG


 


 Ondansetron HCl  4 mg  ONCE  ONCE


 IVP  23 11:15


 23 11:16 DC 23 11:14


4 MG








Vital Signs/I&O











 23





 09:20 09:49 11:08


 


Temp 36.6  


 


Pulse 81 76 67


 


Resp 20 18 


 


B/P (MAP) 198/104 (135) 156/91 (112) 138/68 (91)


 


Pulse Ox 98 93 98


 


O2 Delivery Room Air Room Air Room Air














Blood Pressure Mean:                    135











Progress


Progress Note :  


   Time:  09:44


Progress Note


Chest pain that work-up is being pursued.  Patient was interviewed and examined.

 Patient will be treated with aspirin 162 mg and a trial of nitroglycerin.  She 

will then be reassessed.  I am adding COVID and influenza screening as well.  

Zofran is being given for her nausea.  Remainder of work-up and treatment will 

be determined by her response to these interventions and results of her labs.


Initial ECG Impression Date:  2023


Initial ECG Impression Time:  09:23


Initial ECG Rate:  83


Initial ECG Rhythm:  Normal Sinus


Initial ECG Intervals:  Normal


Initial ECG Impression:  Normal


Comment


Normal sinus rhythm with no ST elevation or depression.  No abnormal intervals 

or axis deviation.





Diagnostic Imaging





   Diagonstic Imaging:  Xray


   Plain Films/CT/US/NM/MRI:  chest


Comments


NAME:   MIGEL MARTINEZ


[facility] REC#:   [id number]


ACCOUNT#:   [id number]


PT STATUS:   REG ER


:   1960


PHYSICIAN:   CARLOS DEVLIN MD


ADMIT DATE:   23/ER


***Signed***


Date of Exam:23





CHEST 1 VIEW, AP/PA ONLY








CHEST 1 VIEW, AP/PA ONLY





Indication: Chest pain. 





Comparison: None available. 





Findings:


No focal airspace disease in the visualized lungs. No pleural


effusion or pneumothorax. Normal cardiomediastinal silhouette.





Impression: 


1. No acute cardiopulmonary process by portable radiography.





Dictated by: 





  Dictated on workstation # [id number]








Dict:   [id number]


Trans:   [id number]


[facility] [id number]





Interpreted by:     KEITH BARILLAS MD


Electronically signed by: KEITH BARILLAS MD 23 0956





Departure


Impression





   Primary Impression:  


   Atypical chest pain


   Additional Impression:  


   History of coronary artery disease


Disposition:   HOME, SELF-CARE


Condition:  Improved





Departure-Patient Inst.


Decision time for Depature:  12:29


Referrals:  


YOUSUF THMOPSON MD (PCP/Family)


Primary Care Physician








LAVINIA MEDINA MD


Patient Instructions:  Acid Reflux and Gastroesophageal Reflux Disease in 

Adults, Chest Pain That Is Not Caused by the Heart (DC), Coronary Artery Disease





Add. Discharge Instructions:  


Your cardiac work-up in the emergency room was unremarkable.  However, because 

of your history of coronary artery disease, you should follow-up with Dr. Medina 

as soon as possible.  Please contact his office and ask if an expedited 

appointment can be arranged since you were seen in the ER for chest pain.  Take 

aspirin 81 mg daily in the meantime.





Increase your Pepcid (or generic famotidine) to 20 mg twice daily.  If this does

not improve your symptoms after several days, you may add omeprazole 20 mg 

daily.  These medications may be purchased over-the-counter.





Avoid the following: Eating large meals, eating close to bedtime, caffeine, 

carbonation, chocolate, citrus fruits and juices, tomato products, alcohol, 

tobacco, mints, spicy foods, fatty/greasy foods, NSAID medications such as 

ibuprofen or naproxen, and anything else you know irritates your stomach.





Elevating the head of your bed or using several pillows to elevate your head 

above your stomach may be helpful in reducing acid reflux.





Please also follow-up with a primary care provider soon as possible.  Discussed 

possible referral to a surgeon or gastroenterologist for endoscopy to visually 

evaluate your esophagus and stomach.





Please return to the emergency room if you have worsening symptoms despite 

following these instructions.








All discharge instructions reviewed with patient and/or family. Voiced 

understanding.





Copy


Copies To 1:   LAVINIA MEDINA MD


Copies To 2:   YOUSUF THOMPSON MD, JOSHUA T MD        2023 09:45

## 2023-03-07 ENCOUNTER — HOSPITAL ENCOUNTER (OUTPATIENT)
Dept: HOSPITAL 75 - ONC | Age: 63
LOS: 24 days | Discharge: HOME | End: 2023-03-31
Attending: INTERNAL MEDICINE
Payer: COMMERCIAL

## 2023-03-07 DIAGNOSIS — Z53.9: Primary | ICD-10-CM

## 2023-04-14 ENCOUNTER — HOSPITAL ENCOUNTER (OUTPATIENT)
Dept: HOSPITAL 75 - ONC | Age: 63
LOS: 16 days | End: 2023-04-30
Attending: INTERNAL MEDICINE
Payer: COMMERCIAL

## 2023-04-14 DIAGNOSIS — Z86.711: Primary | ICD-10-CM

## 2023-04-14 DIAGNOSIS — E78.2: ICD-10-CM

## 2023-04-14 DIAGNOSIS — I10: ICD-10-CM

## 2023-04-14 DIAGNOSIS — I25.10: ICD-10-CM

## 2023-12-04 ENCOUNTER — HOSPITAL ENCOUNTER (OUTPATIENT)
Dept: HOSPITAL 75 - RAD | Age: 63
End: 2023-12-04
Attending: FAMILY MEDICINE
Payer: MEDICARE

## 2023-12-04 DIAGNOSIS — Z12.31: Primary | ICD-10-CM

## 2023-12-04 PROCEDURE — 77067 SCR MAMMO BI INCL CAD: CPT

## 2023-12-04 PROCEDURE — 77063 BREAST TOMOSYNTHESIS BI: CPT

## 2023-12-04 NOTE — DIAGNOSTIC IMAGING REPORT
EXAMINATION:

3D bilateral screening mammogram with CAD.



INDICATION: 

Screening.



COMPARISON: 

This study was compared to the prior exams of 01/21/2021 and

04/02/2019.



PERSONAL HISTORY:

At this time, there are no current complaints. 



FINDINGS:

The breasts are heterogenously dense which may obscure small

masses. When compared to the previous study, there does not

appear to have been any significant change. There is no primary

or secondary sign of malignancy noted.



IMPRESSION: 

There is no evidence for malignancy.



ACR BI-RADS Category 1: Negative.

Result letter will be mailed to the patient.

Note:  At least 10% of breast cancer is not imaged by

mammography.



Dictated by: 



  Dictated on workstation # [id number]